# Patient Record
Sex: FEMALE | Race: WHITE
[De-identification: names, ages, dates, MRNs, and addresses within clinical notes are randomized per-mention and may not be internally consistent; named-entity substitution may affect disease eponyms.]

---

## 2017-01-04 PROBLEM — Z00.00 ENCOUNTER FOR PREVENTIVE HEALTH EXAMINATION: Status: ACTIVE | Noted: 2017-01-04

## 2017-01-30 ENCOUNTER — APPOINTMENT (OUTPATIENT)
Dept: OPHTHALMOLOGY | Facility: CLINIC | Age: 37
End: 2017-01-30

## 2017-03-16 ENCOUNTER — APPOINTMENT (OUTPATIENT)
Dept: OPHTHALMOLOGY | Facility: CLINIC | Age: 37
End: 2017-03-16

## 2017-05-01 ENCOUNTER — APPOINTMENT (OUTPATIENT)
Dept: OPHTHALMOLOGY | Facility: CLINIC | Age: 37
End: 2017-05-01

## 2017-07-14 ENCOUNTER — RX RENEWAL (OUTPATIENT)
Age: 37
End: 2017-07-14

## 2017-08-14 ENCOUNTER — APPOINTMENT (OUTPATIENT)
Dept: OPHTHALMOLOGY | Facility: CLINIC | Age: 37
End: 2017-08-14
Payer: COMMERCIAL

## 2017-08-14 PROCEDURE — 92014 COMPRE OPH EXAM EST PT 1/>: CPT

## 2017-10-16 ENCOUNTER — TRANSCRIPTION ENCOUNTER (OUTPATIENT)
Age: 37
End: 2017-10-16

## 2017-11-20 ENCOUNTER — APPOINTMENT (OUTPATIENT)
Dept: OPHTHALMOLOGY | Facility: CLINIC | Age: 37
End: 2017-11-20
Payer: COMMERCIAL

## 2017-11-20 PROCEDURE — 92012 INTRM OPH EXAM EST PATIENT: CPT

## 2018-03-19 ENCOUNTER — APPOINTMENT (OUTPATIENT)
Dept: OPHTHALMOLOGY | Facility: CLINIC | Age: 38
End: 2018-03-19
Payer: COMMERCIAL

## 2018-03-19 PROCEDURE — 92012 INTRM OPH EXAM EST PATIENT: CPT

## 2018-03-19 RX ORDER — OLOPATADINE HYDROCHLORIDE 2 MG/ML
0.2 SOLUTION OPHTHALMIC
Qty: 1 | Refills: 5 | Status: ACTIVE | COMMUNITY
Start: 2018-03-19 | End: 1900-01-01

## 2018-07-05 ENCOUNTER — APPOINTMENT (OUTPATIENT)
Dept: OPHTHALMOLOGY | Facility: CLINIC | Age: 38
End: 2018-07-05
Payer: COMMERCIAL

## 2018-07-05 PROCEDURE — 92012 INTRM OPH EXAM EST PATIENT: CPT

## 2018-10-01 ENCOUNTER — APPOINTMENT (OUTPATIENT)
Dept: OPHTHALMOLOGY | Facility: CLINIC | Age: 38
End: 2018-10-01
Payer: COMMERCIAL

## 2018-10-01 PROCEDURE — 92012 INTRM OPH EXAM EST PATIENT: CPT

## 2019-01-03 ENCOUNTER — APPOINTMENT (OUTPATIENT)
Dept: OPHTHALMOLOGY | Facility: CLINIC | Age: 39
End: 2019-01-03
Payer: COMMERCIAL

## 2019-01-03 PROCEDURE — 92012 INTRM OPH EXAM EST PATIENT: CPT

## 2019-01-11 ENCOUNTER — APPOINTMENT (OUTPATIENT)
Dept: OPHTHALMOLOGY | Facility: CLINIC | Age: 39
End: 2019-01-11
Payer: COMMERCIAL

## 2019-01-11 PROCEDURE — 92331: CPT | Mod: NC

## 2019-01-18 ENCOUNTER — APPOINTMENT (OUTPATIENT)
Dept: OPHTHALMOLOGY | Facility: CLINIC | Age: 39
End: 2019-01-18
Payer: COMMERCIAL

## 2019-01-18 PROCEDURE — 92310 CONTACT LENS FITTING OU: CPT

## 2019-01-25 ENCOUNTER — APPOINTMENT (OUTPATIENT)
Dept: OPHTHALMOLOGY | Facility: CLINIC | Age: 39
End: 2019-01-25
Payer: COMMERCIAL

## 2019-01-25 DIAGNOSIS — H52.203 UNSPECIFIED ASTIGMATISM, BILATERAL: ICD-10-CM

## 2019-01-25 PROCEDURE — 92331: CPT | Mod: NC

## 2019-02-06 ENCOUNTER — RX RENEWAL (OUTPATIENT)
Age: 39
End: 2019-02-06

## 2019-02-07 ENCOUNTER — RX RENEWAL (OUTPATIENT)
Age: 39
End: 2019-02-07

## 2019-02-28 ENCOUNTER — APPOINTMENT (OUTPATIENT)
Dept: OPHTHALMOLOGY | Facility: CLINIC | Age: 39
End: 2019-02-28
Payer: COMMERCIAL

## 2019-02-28 PROCEDURE — 92012 INTRM OPH EXAM EST PATIENT: CPT

## 2019-04-04 ENCOUNTER — APPOINTMENT (OUTPATIENT)
Dept: OPHTHALMOLOGY | Facility: CLINIC | Age: 39
End: 2019-04-04

## 2019-05-30 ENCOUNTER — APPOINTMENT (OUTPATIENT)
Dept: OPHTHALMOLOGY | Facility: CLINIC | Age: 39
End: 2019-05-30
Payer: COMMERCIAL

## 2019-05-30 DIAGNOSIS — H10.13 ACUTE ATOPIC CONJUNCTIVITIS, BILATERAL: ICD-10-CM

## 2019-05-30 DIAGNOSIS — H16.422: ICD-10-CM

## 2019-05-30 DIAGNOSIS — H40.002 PREGLAUCOMA, UNSPECIFIED, LEFT EYE: ICD-10-CM

## 2019-05-30 DIAGNOSIS — B00.52 HERPESVIRAL KERATITIS: ICD-10-CM

## 2019-05-30 PROCEDURE — 92014 COMPRE OPH EXAM EST PT 1/>: CPT

## 2019-05-30 RX ORDER — ACYCLOVIR 800 MG/1
800 TABLET ORAL
Qty: 60 | Refills: 3 | Status: ACTIVE | COMMUNITY
Start: 2017-07-14 | End: 1900-01-01

## 2019-05-30 RX ORDER — PREDNISOLONE ACETATE 10 MG/ML
1 SUSPENSION/ DROPS OPHTHALMIC
Qty: 1 | Refills: 5 | Status: ACTIVE | COMMUNITY
Start: 2019-05-30 | End: 1900-01-01

## 2019-07-05 ENCOUNTER — TRANSCRIPTION ENCOUNTER (OUTPATIENT)
Age: 39
End: 2019-07-05

## 2019-07-08 ENCOUNTER — APPOINTMENT (OUTPATIENT)
Dept: OPHTHALMOLOGY | Facility: CLINIC | Age: 39
End: 2019-07-08
Payer: COMMERCIAL

## 2019-07-08 ENCOUNTER — NON-APPOINTMENT (OUTPATIENT)
Age: 39
End: 2019-07-08

## 2019-07-08 PROCEDURE — 92012 INTRM OPH EXAM EST PATIENT: CPT

## 2019-08-19 ENCOUNTER — NON-APPOINTMENT (OUTPATIENT)
Age: 39
End: 2019-08-19

## 2019-08-19 ENCOUNTER — APPOINTMENT (OUTPATIENT)
Dept: OPHTHALMOLOGY | Facility: CLINIC | Age: 39
End: 2019-08-19
Payer: COMMERCIAL

## 2019-08-19 PROCEDURE — 92014 COMPRE OPH EXAM EST PT 1/>: CPT

## 2019-10-21 ENCOUNTER — APPOINTMENT (OUTPATIENT)
Dept: OPHTHALMOLOGY | Facility: CLINIC | Age: 39
End: 2019-10-21
Payer: COMMERCIAL

## 2019-10-21 ENCOUNTER — NON-APPOINTMENT (OUTPATIENT)
Age: 39
End: 2019-10-21

## 2019-10-21 PROCEDURE — 92012 INTRM OPH EXAM EST PATIENT: CPT

## 2020-01-06 ENCOUNTER — NON-APPOINTMENT (OUTPATIENT)
Age: 40
End: 2020-01-06

## 2020-01-06 ENCOUNTER — APPOINTMENT (OUTPATIENT)
Dept: OPHTHALMOLOGY | Facility: CLINIC | Age: 40
End: 2020-01-06
Payer: COMMERCIAL

## 2020-01-06 PROCEDURE — 92012 INTRM OPH EXAM EST PATIENT: CPT

## 2020-01-29 ENCOUNTER — NON-APPOINTMENT (OUTPATIENT)
Age: 40
End: 2020-01-29

## 2020-04-02 ENCOUNTER — APPOINTMENT (OUTPATIENT)
Dept: OPHTHALMOLOGY | Facility: CLINIC | Age: 40
End: 2020-04-02

## 2020-05-18 ENCOUNTER — APPOINTMENT (OUTPATIENT)
Dept: OPHTHALMOLOGY | Facility: CLINIC | Age: 40
End: 2020-05-18
Payer: COMMERCIAL

## 2020-05-18 ENCOUNTER — NON-APPOINTMENT (OUTPATIENT)
Age: 40
End: 2020-05-18

## 2020-05-18 PROCEDURE — 99442: CPT

## 2020-08-31 ENCOUNTER — NON-APPOINTMENT (OUTPATIENT)
Age: 40
End: 2020-08-31

## 2020-08-31 ENCOUNTER — APPOINTMENT (OUTPATIENT)
Dept: OPHTHALMOLOGY | Facility: CLINIC | Age: 40
End: 2020-08-31
Payer: COMMERCIAL

## 2020-08-31 PROCEDURE — 99441: CPT

## 2020-11-16 ENCOUNTER — APPOINTMENT (OUTPATIENT)
Dept: OPHTHALMOLOGY | Facility: CLINIC | Age: 40
End: 2020-11-16
Payer: COMMERCIAL

## 2020-11-16 ENCOUNTER — NON-APPOINTMENT (OUTPATIENT)
Age: 40
End: 2020-11-16

## 2020-11-16 PROCEDURE — 99441: CPT

## 2021-02-04 ENCOUNTER — NON-APPOINTMENT (OUTPATIENT)
Age: 41
End: 2021-02-04

## 2021-02-04 ENCOUNTER — APPOINTMENT (OUTPATIENT)
Dept: OPHTHALMOLOGY | Facility: CLINIC | Age: 41
End: 2021-02-04
Payer: COMMERCIAL

## 2021-02-04 PROCEDURE — 99072 ADDL SUPL MATRL&STAF TM PHE: CPT

## 2021-02-04 PROCEDURE — 92014 COMPRE OPH EXAM EST PT 1/>: CPT

## 2021-04-19 ENCOUNTER — NON-APPOINTMENT (OUTPATIENT)
Age: 41
End: 2021-04-19

## 2021-04-19 ENCOUNTER — APPOINTMENT (OUTPATIENT)
Dept: OPHTHALMOLOGY | Facility: CLINIC | Age: 41
End: 2021-04-19
Payer: COMMERCIAL

## 2021-04-19 PROCEDURE — 92133 CPTRZD OPH DX IMG PST SGM ON: CPT

## 2021-04-19 PROCEDURE — 99072 ADDL SUPL MATRL&STAF TM PHE: CPT

## 2021-04-19 PROCEDURE — 92012 INTRM OPH EXAM EST PATIENT: CPT

## 2021-06-28 ENCOUNTER — APPOINTMENT (OUTPATIENT)
Dept: OPHTHALMOLOGY | Facility: CLINIC | Age: 41
End: 2021-06-28
Payer: COMMERCIAL

## 2021-06-28 ENCOUNTER — NON-APPOINTMENT (OUTPATIENT)
Age: 41
End: 2021-06-28

## 2021-06-28 PROCEDURE — 99072 ADDL SUPL MATRL&STAF TM PHE: CPT

## 2021-06-28 PROCEDURE — 92012 INTRM OPH EXAM EST PATIENT: CPT

## 2021-08-30 ENCOUNTER — NON-APPOINTMENT (OUTPATIENT)
Age: 41
End: 2021-08-30

## 2021-08-30 ENCOUNTER — APPOINTMENT (OUTPATIENT)
Dept: OPHTHALMOLOGY | Facility: CLINIC | Age: 41
End: 2021-08-30
Payer: COMMERCIAL

## 2021-08-30 PROCEDURE — 92012 INTRM OPH EXAM EST PATIENT: CPT

## 2021-12-02 ENCOUNTER — NON-APPOINTMENT (OUTPATIENT)
Age: 41
End: 2021-12-02

## 2021-12-02 ENCOUNTER — APPOINTMENT (OUTPATIENT)
Dept: OPHTHALMOLOGY | Facility: CLINIC | Age: 41
End: 2021-12-02
Payer: COMMERCIAL

## 2021-12-02 PROCEDURE — 92012 INTRM OPH EXAM EST PATIENT: CPT

## 2022-03-03 ENCOUNTER — NON-APPOINTMENT (OUTPATIENT)
Age: 42
End: 2022-03-03

## 2022-03-03 ENCOUNTER — APPOINTMENT (OUTPATIENT)
Dept: OPHTHALMOLOGY | Facility: CLINIC | Age: 42
End: 2022-03-03
Payer: COMMERCIAL

## 2022-03-03 PROCEDURE — 92012 INTRM OPH EXAM EST PATIENT: CPT

## 2022-05-24 ENCOUNTER — NON-APPOINTMENT (OUTPATIENT)
Age: 42
End: 2022-05-24

## 2022-05-24 ENCOUNTER — APPOINTMENT (OUTPATIENT)
Dept: OBGYN | Facility: CLINIC | Age: 42
End: 2022-05-24
Payer: COMMERCIAL

## 2022-05-24 VITALS
HEIGHT: 63 IN | SYSTOLIC BLOOD PRESSURE: 181 MMHG | BODY MASS INDEX: 19.67 KG/M2 | HEART RATE: 87 BPM | WEIGHT: 111 LBS | DIASTOLIC BLOOD PRESSURE: 98 MMHG | OXYGEN SATURATION: 99 %

## 2022-05-24 DIAGNOSIS — I10 ESSENTIAL (PRIMARY) HYPERTENSION: ICD-10-CM

## 2022-05-24 PROCEDURE — 99386 PREV VISIT NEW AGE 40-64: CPT

## 2022-05-24 RX ORDER — LOSARTAN POTASSIUM 50 MG/1
50 TABLET, FILM COATED ORAL
Qty: 90 | Refills: 0 | Status: ACTIVE | COMMUNITY
Start: 2022-05-12

## 2022-05-24 RX ORDER — SODIUM FLUORIDE 6 MG/ML
1.1 PASTE DENTAL
Qty: 100 | Refills: 0 | Status: ACTIVE | COMMUNITY
Start: 2022-04-29

## 2022-05-24 RX ORDER — ALBUTEROL SULFATE 90 UG/1
108 (90 BASE) INHALANT RESPIRATORY (INHALATION)
Qty: 8 | Refills: 0 | Status: ACTIVE | COMMUNITY
Start: 2022-01-07

## 2022-05-24 NOTE — HISTORY OF PRESENT ILLNESS
[Regular Cycle Intervals] : periods have been regular [Frequency: Q ___ days] : menstrual periods occur approximately every [unfilled] days [Previously active] : previously active [Men] : men [No] : No [Patient would like to be screened for STIs] : Patient would like to be screened for STIs [Patient reported mammogram was normal] : Patient reported mammogram was normal [Mammogramdate] : 02/2022 [PGHxTotal] : 2 [[facility] : [facility] [[facility] [place] : 2 [FreeTextEntry1] : 05/20/22

## 2022-05-24 NOTE — PHYSICAL EXAM

## 2022-05-24 NOTE — PLAN
[FreeTextEntry1] : HTN - /98, reports feeling anxiety prior to BP measurements. Pt does not check her BPs at home and feels elevated reading are due to anxiety. Recommended home BP monitoring to better evaluate baseline BPs and follow up with PCP for further management.

## 2022-05-25 LAB — HPV HIGH+LOW RISK DNA PNL CVX: NOT DETECTED

## 2022-05-27 LAB — CYTOLOGY CVX/VAG DOC THIN PREP: NORMAL

## 2022-06-16 ENCOUNTER — NON-APPOINTMENT (OUTPATIENT)
Age: 42
End: 2022-06-16

## 2022-06-16 ENCOUNTER — APPOINTMENT (OUTPATIENT)
Dept: OPHTHALMOLOGY | Facility: CLINIC | Age: 42
End: 2022-06-16
Payer: COMMERCIAL

## 2022-06-16 PROCEDURE — 92083 EXTENDED VISUAL FIELD XM: CPT

## 2022-06-16 PROCEDURE — 92012 INTRM OPH EXAM EST PATIENT: CPT

## 2022-07-20 ENCOUNTER — APPOINTMENT (OUTPATIENT)
Dept: OBGYN | Facility: CLINIC | Age: 42
End: 2022-07-20

## 2022-09-16 ENCOUNTER — NON-APPOINTMENT (OUTPATIENT)
Age: 42
End: 2022-09-16

## 2022-09-16 ENCOUNTER — APPOINTMENT (OUTPATIENT)
Dept: OPHTHALMOLOGY | Facility: CLINIC | Age: 42
End: 2022-09-16

## 2022-09-16 PROCEDURE — 92012 INTRM OPH EXAM EST PATIENT: CPT

## 2023-06-12 ENCOUNTER — NON-APPOINTMENT (OUTPATIENT)
Age: 43
End: 2023-06-12

## 2023-07-18 ENCOUNTER — APPOINTMENT (OUTPATIENT)
Dept: OBGYN | Facility: CLINIC | Age: 43
End: 2023-07-18

## 2023-07-28 ENCOUNTER — APPOINTMENT (OUTPATIENT)
Dept: OBGYN | Facility: CLINIC | Age: 43
End: 2023-07-28
Payer: COMMERCIAL

## 2023-07-28 VITALS
SYSTOLIC BLOOD PRESSURE: 180 MMHG | OXYGEN SATURATION: 99 % | WEIGHT: 112 LBS | HEART RATE: 100 BPM | BODY MASS INDEX: 19.84 KG/M2 | DIASTOLIC BLOOD PRESSURE: 99 MMHG

## 2023-07-28 DIAGNOSIS — Z01.419 ENCOUNTER FOR GYNECOLOGICAL EXAMINATION (GENERAL) (ROUTINE) W/OUT ABNORMAL FINDINGS: ICD-10-CM

## 2023-07-28 PROCEDURE — 99396 PREV VISIT EST AGE 40-64: CPT

## 2023-07-28 NOTE — PHYSICAL EXAM
[Chaperone Present] : A chaperone was present in the examining room during all aspects of the physical examination [Appropriately responsive] : appropriately responsive [Alert] : alert [No Acute Distress] : no acute distress [Soft] : soft [Non-tender] : non-tender [Non-distended] : non-distended [No Lesions] : no lesions [No Mass] : no mass [Oriented x3] : oriented x3 [Breast Palpation Diffuse Fibrous Tissue Bilateral] : fibrocystic changes [Examination Of The Breasts] : a normal appearance [No Masses] : no breast masses were palpable [Labia Majora] : normal [Labia Minora] : normal [Normal] : normal [Uterine Adnexae] : normal [FreeTextEntry8] : without pain or tenderness

## 2023-07-28 NOTE — HISTORY OF PRESENT ILLNESS
[Patient reported PAP Smear was normal] : Patient reported PAP Smear was normal [N] : Patient does not use contraception [Normal Amount/Duration] :  normal amount and duration [Spotting Between  Menses] : spotting reported between menses [Regular Cycle Intervals] : periods have been regular [Frequency: Q ___ days] : menstrual periods occur approximately every [unfilled] days [Previously active] : previously active [Men] : men [Patient reported mammogram was normal] : Patient reported mammogram was normal [Patient reported breast sonogram was normal] : Patient reported breast sonogram was normal [Y] : Positive pregnancy history [No] : Patient does not have concerns regarding sex [Mammogramdate] : 3/18/2023 [BreastSonogramDate] : 3/18/2023 [PapSmeardate] : 5/24/2022 [TextBox_31] : HPV Negative [LMPDate] : 7/19/2023 [MensesFreq] : 23-70 [MensesLength] : 5-6 [FreeTextEntry1] : 7/19/2023

## 2023-08-01 LAB
CYTOLOGY CVX/VAG DOC THIN PREP: ABNORMAL
HPV HIGH+LOW RISK DNA PNL CVX: NOT DETECTED

## 2024-07-25 ENCOUNTER — NON-APPOINTMENT (OUTPATIENT)
Age: 44
End: 2024-07-25

## 2024-08-06 ENCOUNTER — APPOINTMENT (OUTPATIENT)
Dept: OBGYN | Facility: CLINIC | Age: 44
End: 2024-08-06

## 2024-08-06 PROCEDURE — 99386 PREV VISIT NEW AGE 40-64: CPT

## 2024-08-06 NOTE — PHYSICAL EXAM
[Examination Of The Breasts] : a normal appearance [No Masses] : no breast masses were palpable [Normal] : normal [Uterine Adnexae] : normal

## 2024-08-12 NOTE — PLAN
[FreeTextEntry1] : 43 yo initial GYN visit establish care - Pap performed - Pt would like STI testing, blood drawn and from PAP - Referral for colonoscopy given  - RTC 1 yr  [place] PGY1

## 2024-08-12 NOTE — HISTORY OF PRESENT ILLNESS
[Patient reported mammogram was normal] : Patient reported mammogram was normal [Patient reported PAP Smear was normal] : Patient reported PAP Smear was normal Name band; [Y] : Positive pregnancy history [Mammogramdate] : 4/13/2024 [PapSmeardate] : 7/28/2023 [LMPDate] : 7/23/2024 [MensesFreq] : 21-28 [MensesLength] : 5 [PGHxTotal] : 2 [[facility]] : 2 [Normal Amount/Duration] :  normal amount and duration [Regular Cycle Intervals] : periods have been regular [Menarche Age: ____] : age at menarche was [unfilled] [Currently Active] : currently active [FreeTextEntry1] : CC: Patient presenting to establish care, annual gyn visit. Patient is 45 yo  presenting to [facility] care. States her insurance changed and she had to change OB/GYN. She has a history of HTN on losartan 50 mg, followed by cardiology and stable. Patient states in the last year, her menses has become more irregular; previously every 4 weeks now every 3-4 weeks. Denies vaginal dryness/irritation, hot flashes, mood swings. LMP 2 weeks ago   States she is sexually active with the same partner, irregularly uses condoms for protection.  Mammo done 2024 - birads 2 benign.  colonoscopy - never  last pap - 1 year ago   MHx - herpes simplex heratitis, follows with optho and stable, HTN follows with cardio on losartan.  AALIYAH - Breast surgery lumpectomy for fibroadenoma in . Tonsillectomy. Aller - none Rx - losartan 50 mg

## 2024-08-12 NOTE — HISTORY OF PRESENT ILLNESS
[Patient reported mammogram was normal] : Patient reported mammogram was normal [Patient reported PAP Smear was normal] : Patient reported PAP Smear was normal [Y] : Positive pregnancy history [Mammogramdate] : 4/13/2024 [LMPDate] : 7/23/2024 [PapSmeardate] : 7/28/2023 [MensesFreq] : 21-28 [MensesLength] : 5 [PGHxTotal] : 2 [[facility]Living] : 2 [Normal Amount/Duration] :  normal amount and duration [Regular Cycle Intervals] : periods have been regular [Menarche Age: ____] : age at menarche was [unfilled] [Currently Active] : currently active [FreeTextEntry1] : CC: Patient presenting to establish care, annual gyn visit. Patient is 45 yo  presenting to [facility] care. States her insurance changed and she had to change OB/GYN. She has a history of HTN on losartan 50 mg, followed by cardiology and stable. Patient states in the last year, her menses has become more irregular; previously every 4 weeks now every 3-4 weeks. Denies vaginal dryness/irritation, hot flashes, mood swings. LMP 2 weeks ago   States she is sexually active with the same partner, irregularly uses condoms for protection.  Mammo done 2024 - birads 2 benign.  colonoscopy - never  last pap - 1 year ago   MHx - herpes simplex heratitis, follows with optho and stable, HTN follows with cardio on losartan.  AALIYAH - Breast surgery lumpectomy for fibroadenoma in . Tonsillectomy. Aller - none Rx - losartan 50 mg

## 2024-08-12 NOTE — END OF VISIT
[] : Resident [FreeTextEntry3] : Age-appropriate health screening discussed.   Questions answered.   Referred to GI

## 2024-08-12 NOTE — HISTORY OF PRESENT ILLNESS
[Patient reported mammogram was normal] : Patient reported mammogram was normal [Patient reported PAP Smear was normal] : Patient reported PAP Smear was normal [Y] : Positive pregnancy history [Mammogramdate] : 4/13/2024 [LMPDate] : 7/23/2024 [PapSmeardate] : 7/28/2023 [MensesFreq] : 21-28 [MensesLength] : 5 [PGHxTotal] : 2 [[facility]] : 2 [Normal Amount/Duration] :  normal amount and duration [Regular Cycle Intervals] : periods have been regular [Menarche Age: ____] : age at menarche was [unfilled] [Currently Active] : currently active [FreeTextEntry1] : CC: Patient presenting to establish care, annual gyn visit. Patient is 43 yo  presenting to [facility] care. States her insurance changed and she had to change OB/GYN. She has a history of HTN on losartan 50 mg, followed by cardiology and stable. Patient states in the last year, her menses has become more irregular; previously every 4 weeks now every 3-4 weeks. Denies vaginal dryness/irritation, hot flashes, mood swings. LMP 2 weeks ago   States she is sexually active with the same partner, irregularly uses condoms for protection.  Mammo done 2024 - birads 2 benign.  colonoscopy - never  last pap - 1 year ago   MHx - herpes simplex heratitis, follows with optho and stable, HTN follows with cardio on losartan.  AALIYAH - Breast surgery lumpectomy for fibroadenoma in . Tonsillectomy. Aller - none Rx - losartan 50 mg

## 2024-08-12 NOTE — PLAN
[FreeTextEntry1] : 45 yo initial GYN visit establish care - Pap performed - Pt would like STI testing, blood drawn and from PAP - Referral for colonoscopy given  - RTC 1 yr  [place] PGY1

## 2024-08-13 ENCOUNTER — NON-APPOINTMENT (OUTPATIENT)
Age: 44
End: 2024-08-13

## 2024-12-12 ENCOUNTER — TRANSCRIPTION ENCOUNTER (OUTPATIENT)
Age: 44
End: 2024-12-12

## 2025-01-06 ENCOUNTER — NON-APPOINTMENT (OUTPATIENT)
Age: 45
End: 2025-01-06

## 2025-01-06 ENCOUNTER — APPOINTMENT (OUTPATIENT)
Dept: GASTROENTEROLOGY | Facility: CLINIC | Age: 45
End: 2025-01-06

## 2025-01-06 VITALS
RESPIRATION RATE: 16 BRPM | HEIGHT: 63 IN | BODY MASS INDEX: 19.49 KG/M2 | TEMPERATURE: 95.2 F | HEART RATE: 98 BPM | WEIGHT: 110 LBS | OXYGEN SATURATION: 99 %

## 2025-01-06 DIAGNOSIS — Z12.11 ENCOUNTER FOR SCREENING FOR MALIGNANT NEOPLASM OF COLON: ICD-10-CM

## 2025-01-06 RX ORDER — SODIUM SULFATE, POTASSIUM SULFATE AND MAGNESIUM SULFATE 1.6; 3.13; 17.5 G/177ML; G/177ML; G/177ML
17.5-3.13-1.6 SOLUTION ORAL
Qty: 1 | Refills: 0 | Status: ACTIVE | COMMUNITY
Start: 2025-01-06 | End: 1900-01-01

## 2025-02-04 ENCOUNTER — TRANSCRIPTION ENCOUNTER (OUTPATIENT)
Age: 45
End: 2025-02-04

## 2025-03-18 DIAGNOSIS — Z00.00 ENCOUNTER FOR GENERAL ADULT MEDICAL EXAMINATION W/OUT ABNORMAL FINDINGS: ICD-10-CM

## 2025-04-10 ENCOUNTER — APPOINTMENT (OUTPATIENT)
Dept: GASTROENTEROLOGY | Facility: HOSPITAL | Age: 45
End: 2025-04-10

## 2025-06-04 ENCOUNTER — TRANSCRIPTION ENCOUNTER (OUTPATIENT)
Age: 45
End: 2025-06-04

## 2025-06-12 ENCOUNTER — APPOINTMENT (OUTPATIENT)
Dept: GASTROENTEROLOGY | Facility: HOSPITAL | Age: 45
End: 2025-06-12

## 2025-07-29 RX ORDER — SODIUM SULFATE, POTASSIUM SULFATE AND MAGNESIUM SULFATE 1.6; 3.13; 17.5 G/177ML; G/177ML; G/177ML
17.5-3.13-1.6 SOLUTION ORAL
Qty: 1 | Refills: 0 | Status: ACTIVE | COMMUNITY
Start: 2025-07-29 | End: 1900-01-01

## 2025-07-30 ENCOUNTER — TRANSCRIPTION ENCOUNTER (OUTPATIENT)
Age: 45
End: 2025-07-30

## 2025-07-31 ENCOUNTER — APPOINTMENT (OUTPATIENT)
Dept: GASTROENTEROLOGY | Facility: HOSPITAL | Age: 45
End: 2025-07-31

## 2025-07-31 ENCOUNTER — RESULT REVIEW (OUTPATIENT)
Age: 45
End: 2025-07-31

## 2025-07-31 ENCOUNTER — OUTPATIENT (OUTPATIENT)
Dept: OUTPATIENT SERVICES | Facility: HOSPITAL | Age: 45
LOS: 1 days | Discharge: HOME | End: 2025-07-31
Payer: COMMERCIAL

## 2025-07-31 VITALS — WEIGHT: 110.23 LBS

## 2025-07-31 PROCEDURE — 88305 TISSUE EXAM BY PATHOLOGIST: CPT | Mod: 26

## 2025-07-31 PROCEDURE — 45385 COLONOSCOPY W/LESION REMOVAL: CPT | Mod: PT

## 2025-07-31 PROCEDURE — 88305 TISSUE EXAM BY PATHOLOGIST: CPT

## 2025-07-31 PROCEDURE — 45385 COLONOSCOPY W/LESION REMOVAL: CPT | Mod: GC

## 2025-08-01 ENCOUNTER — TRANSCRIPTION ENCOUNTER (OUTPATIENT)
Age: 45
End: 2025-08-01

## 2025-08-07 ENCOUNTER — APPOINTMENT (OUTPATIENT)
Dept: OBGYN | Facility: CLINIC | Age: 45
End: 2025-08-07
Payer: COMMERCIAL

## 2025-08-07 ENCOUNTER — NON-APPOINTMENT (OUTPATIENT)
Age: 45
End: 2025-08-07

## 2025-08-07 VITALS — WEIGHT: 111 LBS | BODY MASS INDEX: 19.66 KG/M2

## 2025-08-07 DIAGNOSIS — N63.13 UNSPECIFIED LUMP IN THE RIGHT BREAST, LOWER OUTER QUADRANT: ICD-10-CM

## 2025-08-07 DIAGNOSIS — N95.1 MENOPAUSAL AND FEMALE CLIMACTERIC STATES: ICD-10-CM

## 2025-08-07 PROCEDURE — 99459 PELVIC EXAMINATION: CPT

## 2025-08-07 PROCEDURE — 99396 PREV VISIT EST AGE 40-64: CPT

## 2025-08-08 LAB
ESTRADIOL SERPL-MCNC: 236 PG/ML
FSH SERPL-MCNC: 3.9 IU/L
LH SERPL-ACNC: 6.5 IU/L
TSH SERPL-ACNC: 0.8 UIU/ML

## 2025-08-11 LAB
CYTOLOGY CVX/VAG DOC THIN PREP: ABNORMAL
HPV HIGH+LOW RISK DNA PNL CVX: NOT DETECTED

## (undated) DEVICE — SNARE LESIONHUNTER ROTAT NITNL COLD 15MM